# Patient Record
Sex: FEMALE | Race: BLACK OR AFRICAN AMERICAN | NOT HISPANIC OR LATINO
[De-identification: names, ages, dates, MRNs, and addresses within clinical notes are randomized per-mention and may not be internally consistent; named-entity substitution may affect disease eponyms.]

---

## 2020-02-25 PROBLEM — Z00.00 ENCOUNTER FOR PREVENTIVE HEALTH EXAMINATION: Status: ACTIVE | Noted: 2020-02-25

## 2020-06-04 ENCOUNTER — APPOINTMENT (OUTPATIENT)
Dept: GASTROENTEROLOGY | Facility: CLINIC | Age: 45
End: 2020-06-04

## 2023-11-09 ENCOUNTER — APPOINTMENT (OUTPATIENT)
Dept: SURGERY | Facility: CLINIC | Age: 48
End: 2023-11-09
Payer: COMMERCIAL

## 2023-11-09 VITALS
DIASTOLIC BLOOD PRESSURE: 80 MMHG | HEART RATE: 87 BPM | HEIGHT: 61 IN | BODY MASS INDEX: 37.76 KG/M2 | OXYGEN SATURATION: 98 % | WEIGHT: 200 LBS | SYSTOLIC BLOOD PRESSURE: 111 MMHG | RESPIRATION RATE: 17 BRPM | TEMPERATURE: 96.7 F

## 2023-11-09 DIAGNOSIS — Z87.09 PERSONAL HISTORY OF OTHER DISEASES OF THE RESPIRATORY SYSTEM: ICD-10-CM

## 2023-11-09 DIAGNOSIS — Z78.9 OTHER SPECIFIED HEALTH STATUS: ICD-10-CM

## 2023-11-09 DIAGNOSIS — Z80.3 FAMILY HISTORY OF MALIGNANT NEOPLASM OF BREAST: ICD-10-CM

## 2023-11-09 DIAGNOSIS — K43.9 VENTRAL HERNIA W/OUT OBSTRUCTION OR GANGRENE: ICD-10-CM

## 2023-11-09 PROCEDURE — 99203 OFFICE O/P NEW LOW 30 MIN: CPT

## 2023-11-09 RX ORDER — ESOMEPRAZOLE MAGNESIUM 5 MG/1
GRANULE, DELAYED RELEASE ORAL
Refills: 0 | Status: ACTIVE | COMMUNITY

## 2023-11-24 ENCOUNTER — TRANSCRIPTION ENCOUNTER (OUTPATIENT)
Age: 48
End: 2023-11-24

## 2023-11-24 ENCOUNTER — OUTPATIENT (OUTPATIENT)
Dept: OUTPATIENT SERVICES | Facility: HOSPITAL | Age: 48
LOS: 1 days | End: 2023-11-24
Payer: COMMERCIAL

## 2023-11-24 VITALS
WEIGHT: 214.95 LBS | SYSTOLIC BLOOD PRESSURE: 110 MMHG | HEIGHT: 62 IN | TEMPERATURE: 98 F | HEART RATE: 85 BPM | OXYGEN SATURATION: 98 % | DIASTOLIC BLOOD PRESSURE: 76 MMHG | RESPIRATION RATE: 15 BRPM

## 2023-11-24 DIAGNOSIS — Z90.3 ACQUIRED ABSENCE OF STOMACH [PART OF]: Chronic | ICD-10-CM

## 2023-11-24 DIAGNOSIS — M79.81 NONTRAUMATIC HEMATOMA OF SOFT TISSUE: ICD-10-CM

## 2023-11-24 DIAGNOSIS — Z98.890 OTHER SPECIFIED POSTPROCEDURAL STATES: Chronic | ICD-10-CM

## 2023-11-24 DIAGNOSIS — Q83.8 OTHER CONGENITAL MALFORMATIONS OF BREAST: ICD-10-CM

## 2023-11-24 DIAGNOSIS — L91.0 HYPERTROPHIC SCAR: ICD-10-CM

## 2023-11-24 DIAGNOSIS — Z01.818 ENCOUNTER FOR OTHER PREPROCEDURAL EXAMINATION: ICD-10-CM

## 2023-11-24 DIAGNOSIS — N62 HYPERTROPHY OF BREAST: ICD-10-CM

## 2023-11-24 DIAGNOSIS — Z98.891 HISTORY OF UTERINE SCAR FROM PREVIOUS SURGERY: Chronic | ICD-10-CM

## 2023-11-24 DIAGNOSIS — Z90.49 ACQUIRED ABSENCE OF OTHER SPECIFIED PARTS OF DIGESTIVE TRACT: Chronic | ICD-10-CM

## 2023-11-24 LAB
MRSA PCR RESULT.: SIGNIFICANT CHANGE UP
MRSA PCR RESULT.: SIGNIFICANT CHANGE UP
S AUREUS DNA NOSE QL NAA+PROBE: DETECTED
S AUREUS DNA NOSE QL NAA+PROBE: DETECTED

## 2023-11-24 PROCEDURE — 87641 MR-STAPH DNA AMP PROBE: CPT

## 2023-11-24 PROCEDURE — 87640 STAPH A DNA AMP PROBE: CPT

## 2023-11-24 PROCEDURE — G0463: CPT

## 2023-11-24 RX ORDER — SODIUM CHLORIDE 9 MG/ML
3 INJECTION INTRAMUSCULAR; INTRAVENOUS; SUBCUTANEOUS EVERY 8 HOURS
Refills: 0 | Status: DISCONTINUED | OUTPATIENT
Start: 2023-12-14 | End: 2023-12-28

## 2023-11-24 RX ORDER — SODIUM CHLORIDE 9 MG/ML
1000 INJECTION, SOLUTION INTRAVENOUS
Refills: 0 | Status: DISCONTINUED | OUTPATIENT
Start: 2023-12-14 | End: 2023-12-28

## 2023-11-24 RX ORDER — LIDOCAINE HCL 20 MG/ML
0.2 VIAL (ML) INJECTION ONCE
Refills: 0 | Status: DISCONTINUED | OUTPATIENT
Start: 2023-12-14 | End: 2023-12-28

## 2023-11-24 NOTE — H&P PST ADULT - PROBLEM SELECTOR PLAN 3
Scheduled for hematoma evacuation of abdomen with Dr. Araya  Chlorhexidine to affected area preop  MRSA done, pending, patient states umbilical hernia to be done

## 2023-11-24 NOTE — H&P PST ADULT - RESPIRATORY
clear to auscultation bilaterally/no wheezes/no rhonchi/airway patent/breath sounds equal/good air movement/respirations non-labored

## 2023-11-24 NOTE — H&P PST ADULT - REASON FOR ADMISSION
Breast reduction and hematoma removal and ? umbilica hernia repair Breast reduction, scar revision and hematoma removal and ? umbilica hernia repair

## 2023-11-24 NOTE — H&P PST ADULT - NSANTHOSAYNRD_GEN_A_CORE
No. GARRICK screening performed.  STOP BANG Legend: 0-2 = LOW Risk; 3-4 = INTERMEDIATE Risk; 5-8 = HIGH Risk

## 2023-11-24 NOTE — H&P PST ADULT - NSICDXPASTMEDICALHX_GEN_ALL_CORE_FT
PAST MEDICAL HISTORY:  Anemia     Severe obesity (BMI 35.0-39.9) with comorbidity     Sprained ankle      PAST MEDICAL HISTORY:  Anemia     Asthma     Severe obesity (BMI 35.0-39.9) with comorbidity     Sprained ankle

## 2023-11-24 NOTE — H&P PST ADULT - PROBLEM SELECTOR PLAN 1
Scheduled for Bilateral breast reduction, with Dr. Toro  Preop instructions given, patient instructed to call surgeon's office re: ambi vs main  CBC, BMP in HIE  Chlorhexidine to affected area preop

## 2023-11-24 NOTE — H&P PST ADULT - NSICDXPASTSURGICALHX_GEN_ALL_CORE_FT
PAST SURGICAL HISTORY:  H/O abdominoplasty      PAST SURGICAL HISTORY:  H/O abdominoplasty     H/O  section     H/O gastric sleeve     History of laparoscopic cholecystectomy

## 2023-11-24 NOTE — H&P PST ADULT - ASSESSMENT
Dental: denies loose teeth, no dentures, two implants upper right and lower right    DASI: Walking 2 days/week for 15 minutes, housework  SCORE; 6

## 2023-11-24 NOTE — H&P PST ADULT - HISTORY OF PRESENT ILLNESS
Ms. Rosales is a 48 year old woman with PMH Anemia, GERD, Asthma and obesity s/p abdominoplasty presents for bilateral breast reduction, abdominal scar revision, and hematoma evacuation of abdomen on 12/14/2023.  Patient states that she is also having umbilical hernia  repair.

## 2023-11-28 DIAGNOSIS — Z22.321 CARRIER OR SUSPECTED CARRIER OF METHICILLIN SUSCEPTIBLE STAPHYLOCOCCUS AUREUS: ICD-10-CM

## 2023-11-28 RX ORDER — MUPIROCIN 20 MG/G
2 OINTMENT TOPICAL
Qty: 1 | Refills: 0 | Status: ACTIVE | COMMUNITY
Start: 2023-11-28 | End: 1900-01-01

## 2023-12-07 DIAGNOSIS — S30.1XXA CONTUSION OF ABDOMINAL WALL, INITIAL ENCOUNTER: ICD-10-CM

## 2023-12-08 ENCOUNTER — TRANSCRIPTION ENCOUNTER (OUTPATIENT)
Age: 48
End: 2023-12-08

## 2023-12-08 PROBLEM — J45.909 UNSPECIFIED ASTHMA, UNCOMPLICATED: Chronic | Status: ACTIVE | Noted: 2023-11-24

## 2023-12-08 PROBLEM — D64.9 ANEMIA, UNSPECIFIED: Chronic | Status: ACTIVE | Noted: 2023-11-24

## 2023-12-08 PROBLEM — S93.409A SPRAIN OF UNSPECIFIED LIGAMENT OF UNSPECIFIED ANKLE, INITIAL ENCOUNTER: Chronic | Status: ACTIVE | Noted: 2023-11-24

## 2023-12-08 PROBLEM — E66.01 MORBID (SEVERE) OBESITY DUE TO EXCESS CALORIES: Chronic | Status: ACTIVE | Noted: 2023-11-24

## 2023-12-13 ENCOUNTER — TRANSCRIPTION ENCOUNTER (OUTPATIENT)
Age: 48
End: 2023-12-13

## 2023-12-14 ENCOUNTER — OUTPATIENT (OUTPATIENT)
Dept: OUTPATIENT SERVICES | Facility: HOSPITAL | Age: 48
LOS: 1 days | End: 2023-12-14
Payer: COMMERCIAL

## 2023-12-14 ENCOUNTER — APPOINTMENT (OUTPATIENT)
Dept: SURGERY | Facility: HOSPITAL | Age: 48
End: 2023-12-14
Payer: COMMERCIAL

## 2023-12-14 ENCOUNTER — TRANSCRIPTION ENCOUNTER (OUTPATIENT)
Age: 48
End: 2023-12-14

## 2023-12-14 VITALS
RESPIRATION RATE: 16 BRPM | SYSTOLIC BLOOD PRESSURE: 107 MMHG | OXYGEN SATURATION: 99 % | WEIGHT: 214.95 LBS | DIASTOLIC BLOOD PRESSURE: 73 MMHG | HEART RATE: 78 BPM | HEIGHT: 62 IN | TEMPERATURE: 97 F

## 2023-12-14 DIAGNOSIS — Z90.3 ACQUIRED ABSENCE OF STOMACH [PART OF]: Chronic | ICD-10-CM

## 2023-12-14 DIAGNOSIS — N62 HYPERTROPHY OF BREAST: ICD-10-CM

## 2023-12-14 DIAGNOSIS — Q83.8 OTHER CONGENITAL MALFORMATIONS OF BREAST: ICD-10-CM

## 2023-12-14 DIAGNOSIS — Z98.891 HISTORY OF UTERINE SCAR FROM PREVIOUS SURGERY: Chronic | ICD-10-CM

## 2023-12-14 DIAGNOSIS — Z90.49 ACQUIRED ABSENCE OF OTHER SPECIFIED PARTS OF DIGESTIVE TRACT: Chronic | ICD-10-CM

## 2023-12-14 DIAGNOSIS — L91.0 HYPERTROPHIC SCAR: ICD-10-CM

## 2023-12-14 DIAGNOSIS — Z98.890 OTHER SPECIFIED POSTPROCEDURAL STATES: Chronic | ICD-10-CM

## 2023-12-14 DIAGNOSIS — M79.81 NONTRAUMATIC HEMATOMA OF SOFT TISSUE: ICD-10-CM

## 2023-12-14 LAB
GRAM STN FLD: SIGNIFICANT CHANGE UP
GRAM STN FLD: SIGNIFICANT CHANGE UP
SPECIMEN SOURCE: SIGNIFICANT CHANGE UP
SPECIMEN SOURCE: SIGNIFICANT CHANGE UP

## 2023-12-14 PROCEDURE — 88305 TISSUE EXAM BY PATHOLOGIST: CPT | Mod: 26

## 2023-12-14 PROCEDURE — 88302 TISSUE EXAM BY PATHOLOGIST: CPT | Mod: 26

## 2023-12-14 PROCEDURE — 88307 TISSUE EXAM BY PATHOLOGIST: CPT | Mod: 26

## 2023-12-14 PROCEDURE — 10140 I&D HMTMA SEROMA/FLUID COLLJ: CPT

## 2023-12-14 PROCEDURE — 49591 RPR AA HRN 1ST < 3 CM RDC: CPT

## 2023-12-14 PROCEDURE — 88304 TISSUE EXAM BY PATHOLOGIST: CPT | Mod: 26

## 2023-12-14 RX ORDER — ACETAMINOPHEN 500 MG
1000 TABLET ORAL ONCE
Refills: 0 | Status: COMPLETED | OUTPATIENT
Start: 2023-12-14 | End: 2023-12-14

## 2023-12-14 RX ORDER — PANTOPRAZOLE SODIUM 20 MG/1
40 TABLET, DELAYED RELEASE ORAL
Refills: 0 | Status: DISCONTINUED | OUTPATIENT
Start: 2023-12-14 | End: 2023-12-28

## 2023-12-14 RX ORDER — ONDANSETRON 8 MG/1
4 TABLET, FILM COATED ORAL ONCE
Refills: 0 | Status: DISCONTINUED | OUTPATIENT
Start: 2023-12-14 | End: 2023-12-28

## 2023-12-14 RX ORDER — SODIUM CHLORIDE 9 MG/ML
500 INJECTION, SOLUTION INTRAVENOUS ONCE
Refills: 0 | Status: DISCONTINUED | OUTPATIENT
Start: 2023-12-14 | End: 2023-12-28

## 2023-12-14 RX ORDER — CEFAZOLIN SODIUM 1 G
1000 VIAL (EA) INJECTION EVERY 8 HOURS
Refills: 0 | Status: DISCONTINUED | OUTPATIENT
Start: 2023-12-14 | End: 2023-12-28

## 2023-12-14 RX ORDER — ENOXAPARIN SODIUM 100 MG/ML
40 INJECTION SUBCUTANEOUS ONCE
Refills: 0 | Status: COMPLETED | OUTPATIENT
Start: 2023-12-15 | End: 2023-12-15

## 2023-12-14 RX ORDER — CHLORHEXIDINE GLUCONATE 213 G/1000ML
1 SOLUTION TOPICAL ONCE
Refills: 0 | Status: COMPLETED | OUTPATIENT
Start: 2023-12-14 | End: 2023-12-14

## 2023-12-14 RX ORDER — FAMOTIDINE 10 MG/ML
20 INJECTION INTRAVENOUS ONCE
Refills: 0 | Status: COMPLETED | OUTPATIENT
Start: 2023-12-14 | End: 2023-12-14

## 2023-12-14 RX ORDER — TRAMADOL HYDROCHLORIDE 50 MG/1
50 TABLET ORAL EVERY 4 HOURS
Refills: 0 | Status: DISCONTINUED | OUTPATIENT
Start: 2023-12-14 | End: 2023-12-15

## 2023-12-14 RX ORDER — ESOMEPRAZOLE MAGNESIUM 40 MG/1
1 CAPSULE, DELAYED RELEASE ORAL
Refills: 0 | DISCHARGE

## 2023-12-14 RX ORDER — FENTANYL CITRATE 50 UG/ML
25 INJECTION INTRAVENOUS
Refills: 0 | Status: DISCONTINUED | OUTPATIENT
Start: 2023-12-14 | End: 2023-12-14

## 2023-12-14 RX ORDER — CALCIUM CARBONATE 500(1250)
1 TABLET ORAL EVERY 4 HOURS
Refills: 0 | Status: DISCONTINUED | OUTPATIENT
Start: 2023-12-14 | End: 2023-12-28

## 2023-12-14 RX ORDER — CEFAZOLIN SODIUM 1 G
2000 VIAL (EA) INJECTION ONCE
Refills: 0 | Status: COMPLETED | OUTPATIENT
Start: 2023-12-14 | End: 2023-12-14

## 2023-12-14 RX ORDER — ACETAMINOPHEN 500 MG
2 TABLET ORAL
Refills: 0 | DISCHARGE

## 2023-12-14 RX ADMIN — SODIUM CHLORIDE 100 MILLILITER(S): 9 INJECTION, SOLUTION INTRAVENOUS at 06:03

## 2023-12-14 RX ADMIN — SODIUM CHLORIDE 3 MILLILITER(S): 9 INJECTION INTRAMUSCULAR; INTRAVENOUS; SUBCUTANEOUS at 23:53

## 2023-12-14 RX ADMIN — Medication 1000 MILLIGRAM(S): at 21:00

## 2023-12-14 RX ADMIN — TRAMADOL HYDROCHLORIDE 50 MILLIGRAM(S): 50 TABLET ORAL at 23:50

## 2023-12-14 RX ADMIN — TRAMADOL HYDROCHLORIDE 50 MILLIGRAM(S): 50 TABLET ORAL at 17:54

## 2023-12-14 RX ADMIN — Medication 100 MILLIGRAM(S): at 21:46

## 2023-12-14 RX ADMIN — FENTANYL CITRATE 25 MICROGRAM(S): 50 INJECTION INTRAVENOUS at 17:50

## 2023-12-14 RX ADMIN — CHLORHEXIDINE GLUCONATE 1 APPLICATION(S): 213 SOLUTION TOPICAL at 06:03

## 2023-12-14 RX ADMIN — FENTANYL CITRATE 25 MICROGRAM(S): 50 INJECTION INTRAVENOUS at 15:05

## 2023-12-14 RX ADMIN — Medication 1 TABLET(S): at 23:48

## 2023-12-14 RX ADMIN — Medication 400 MILLIGRAM(S): at 19:56

## 2023-12-14 RX ADMIN — FAMOTIDINE 20 MILLIGRAM(S): 10 INJECTION INTRAVENOUS at 23:25

## 2023-12-14 NOTE — ASU DISCHARGE PLAN (ADULT/PEDIATRIC) - CARE PROVIDER_API CALL
Declan Toro  Plastic Surgery  [address] [facility]  [place] [address]  Phone: [phone]  Fax: [phone]  Established Patient  Follow Up Time: 1-3 days   Declan Toro  Plastic Surgery  [address] [facility], [address]  [place] [address]  Phone: [phone]  Fax: [phone]  Established Patient  Follow Up Time: 1-3 days

## 2023-12-14 NOTE — BRIEF OPERATIVE NOTE - NSICDXBRIEFPROCEDURE_GEN_ALL_CORE_FT
PROCEDURES:  Reduction mammoplasty with abdominoplasty, with repair of ventral hernia if indicated 14-Dec-2023 12:47:50  Declan Toro  Liposuction, torso 14-Dec-2023 12:48:09  Declan Toro  Evacuation, hematoma, abdomen 14-Dec-2023 12:48:22  Declan Toro

## 2023-12-14 NOTE — BRIEF OPERATIVE NOTE - OPERATION/FINDINGS
Pt states rt sided abdominal pain unable to move bowels, did have a BM today but very little nothing yesterday.  Was seen in ed for the same last week  
Hematoma below rectus sheeth found and evacuated, ventral hernia repaired intraoperatively by general surgery.

## 2023-12-14 NOTE — ASU DISCHARGE PLAN (ADULT/PEDIATRIC) - ASU DC SPECIAL INSTRUCTIONSFT
No heavy lifting/pushing/pulling/strenuous activity.   Sleep with head of bed elevated and hips flexed (i.e. pillows below knees) to help keep tension off of abdomen. DO NOT LAY FLAT.   Empty and record drain output twice daily.   Take all medications sent from our office as prescribed.   Sponge bath only for the first 24 hours after surgery.   Keep all dressing and garments in place until follow up in office.

## 2023-12-14 NOTE — ASU PATIENT PROFILE, ADULT - NSICDXPASTMEDICALHX_GEN_ALL_CORE_FT
PAST MEDICAL HISTORY:  Anemia     Asthma     Severe obesity (BMI 35.0-39.9) with comorbidity     Sprained ankle

## 2023-12-14 NOTE — BRIEF OPERATIVE NOTE - NSICDXBRIEFPREOP_GEN_ALL_CORE_FT
PRE-OP DIAGNOSIS:  Diastasis recti 14-Dec-2023 12:44:14  Declan Toro  Hypertrophic scar 14-Dec-2023 12:44:19  Declan Toro  Other ventral hernia 14-Dec-2023 12:44:17  Declan Toro  Macromastia 14-Dec-2023 12:44:11  Declan Toro

## 2023-12-14 NOTE — ASU DISCHARGE PLAN (ADULT/PEDIATRIC) - PROVIDER TOKENS
PROVIDER:[TOKEN:[[id number]],FOLLOWUP:[1-3 days],ESTABLISHEDPATIENT:[T]] PROVIDER:[TOKEN:[[id number]],FOLLOWUP:[1-3 days],ESTABLISHEDPATIENT:[T]]

## 2023-12-14 NOTE — ASU PATIENT PROFILE, ADULT - NSICDXPASTSURGICALHX_GEN_ALL_CORE_FT
Maritza Vilchis  Pt is a 31year old  at 34w0d   Doing well. Denies LOF/VB/uctx. +FM. Mode of delivery:  anticipated    BPM   FH 34 cm   GBS next week with position. Breech on 32 week US.      RTC 2 weeks    Dr. Naveen Russell MD    [facility] Pt is aware of normal pelvic u/s results.     PAST SURGICAL HISTORY:  H/O abdominoplasty     H/O  section     H/O gastric sleeve     History of laparoscopic cholecystectomy

## 2023-12-14 NOTE — BRIEF OPERATIVE NOTE - NSICDXBRIEFPOSTOP_GEN_ALL_CORE_FT
POST-OP DIAGNOSIS:  Hematoma 14-Dec-2023 12:44:42  Declan Toro  Macromastia 14-Dec-2023 12:42:06  Declan Toro  Diastasis recti 14-Dec-2023 12:42:43  Declan Toro  Other ventral hernia 14-Dec-2023 12:43:29  Declan Toro  Hypertrophic scar 14-Dec-2023 12:43:54  Declan Toro   POST-OP DIAGNOSIS:  Hematoma 14-Dec-2023 12:44:42  Declan Toro  Macromastia 14-Dec-2023 12:42:06  Declan Toro  Diastasis recti 14-Dec-2023 12:42:43  Declan Toro  Other ventral hernia 14-Dec-2023 12:43:29  Declan Toro  Hypertrophic scar 14-Dec-2023 12:43:54  Dcelan Toro

## 2023-12-14 NOTE — ASU DISCHARGE PLAN (ADULT/PEDIATRIC) - NURSING INSTRUCTIONS
You were given Tylenol during your visit, the next dose of Tylenol will be on or after _____12:00 PM______ ,today/tonight and every 6 hours afterwards for pain management, do not take any Tylenol containing products until this time. Do not exceed more than 4000mg of Tylenol in one 24 hour setting. You were given Tylenol during your visit, the next dose of Tylenol will be on or after _____12:00 PM______ ,today/tonight and every 6 hours afterwards for pain management, do not take any Tylenol containing products until this time. Do not exceed more than 4000mg of Tylenol in one 24 hour setting. Next dose of antibiotics can be taken on or after ___2:00 PM___, follow dosing frequency based on prescription. Tramadol may be taken every 6 hours, next dose may be taken on or after ___10:00 AM____.

## 2023-12-14 NOTE — CHART NOTE - NSCHARTNOTEFT_GEN_A_CORE
General Surgery Post op Check    Pt seen and examined without complaints. Reports mild abdominal.. Denies SOB/CP/N/V.     Vital Signs Last 24 Hrs  T(C): 36.6 (14 Dec 2023 12:25), Max: 36.6 (14 Dec 2023 12:25)  T(F): 97.9 (14 Dec 2023 12:25), Max: 97.9 (14 Dec 2023 12:25)  HR: 100 (14 Dec 2023 18:30) (75 - 100)  BP: 112/62 (14 Dec 2023 18:30) (68/41 - 119/56)  BP(mean): 82 (14 Dec 2023 18:30) (51 - 82)  RR: 16 (14 Dec 2023 18:30) (12 - 16)  SpO2: 98% (14 Dec 2023 18:30) (95% - 100%)    Parameters below as of 14 Dec 2023 18:30  Patient On (Oxygen Delivery Method): room air        I&O's Summary    14 Dec 2023 07:01  -  14 Dec 2023 20:44  --------------------------------------------------------  IN: 2940 mL / OUT: 610 mL / NET: 2330 mL        Physical Exam  Gen: NAD, A&Ox3  Pulm: No respiratory distress, no subcostal retractions  CV: RRR, no JVD  Abd: Soft, NT, ND  Breast:  dressing in supportive bra with mild   Drains: YINA x 4  Ouput as follow since 1800  R Abdomnm: 70 cc  L Abdomen: 15 cc  R Breast: 5 cc  L Breast: 10cc  Extremities:  FROM, warm and well perfused, equal bilateral muscle strength      A/P: 48y Female s/p Reduction mammoplasty with abdominoplasty, with repair of ventral hernia, Liposuction to the torso, Hematoma Evacuation by general Sx. Mildly Tachycardic, remainder of vitals stable.  No acute distress.    -DVT prophylaxis with Lovenox, Encouraged OOB  -Hypotension: Encourage PO fluid intake, IV Fluids 120cc/hr x 12 hrs  -Strict I&O's  -Monitor YINA drain output  -Analgesia and antiemetics as needed  -Regular Diet  -Monitor overnight  -Dispo:  Discharge home in AM    Paawn Osuna PA-C  Ambulatory Surgery  #[id number] General Surgery Post op Check    Pt seen and examined without complaints. Reports mild abdominal.. Denies SOB/CP/N/V.     Vital Signs Last 24 Hrs  T(C): 36.6 (14 Dec 2023 12:25), Max: 36.6 (14 Dec 2023 12:25)  T(F): 97.9 (14 Dec 2023 12:25), Max: 97.9 (14 Dec 2023 12:25)  HR: 100 (14 Dec 2023 18:30) (75 - 100)  BP: 112/62 (14 Dec 2023 18:30) (68/41 - 119/56)  BP(mean): 82 (14 Dec 2023 18:30) (51 - 82)  RR: 16 (14 Dec 2023 18:30) (12 - 16)  SpO2: 98% (14 Dec 2023 18:30) (95% - 100%)    Parameters below as of 14 Dec 2023 18:30  Patient On (Oxygen Delivery Method): room air        I&O's Summary    14 Dec 2023 07:01  -  14 Dec 2023 20:44  --------------------------------------------------------  IN: 2940 mL / OUT: 610 mL / NET: 2330 mL        Physical Exam  Gen: NAD, A&Ox3  Pulm: No respiratory distress, no subcostal retractions  CV: RRR, no JVD  Abd: Soft, NT, ND  Breast:  dressing in supportive bra with mild   Drains: YINA x 4  Ouput as follow since 1800  R Abdomnm: 70 cc  L Abdomen: 15 cc  R Breast: 5 cc  L Breast: 10cc  Extremities:  FROM, warm and well perfused, equal bilateral muscle strength      A/P: 48y Female s/p Reduction mammoplasty with abdominoplasty, with repair of ventral hernia, Liposuction to the torso, Hematoma Evacuation by general Sx. Mildly Tachycardic, remainder of vitals stable.  No acute distress.    -DVT prophylaxis with Lovenox, Encouraged OOB  -Hypotension: Encourage PO fluid intake, IV Fluids 120cc/hr x 12 hrs  -Strict I&O's  -Monitor YINA drain output  -Analgesia and antiemetics as needed  -Regular Diet  -Monitor overnight  -Dispo:  Discharge home in AM    Pawan Osuna PA-C  Ambulatory Surgery  #[id number]

## 2023-12-14 NOTE — ASU PATIENT PROFILE, ADULT - FALL HARM RISK - UNIVERSAL INTERVENTIONS
Bed in lowest position, wheels locked, appropriate side rails in place/Call bell, personal items and telephone in reach/Instruct patient to call for assistance before getting out of bed or chair/Non-slip footwear when patient is out of bed/[place] to call system/Physically safe environment - no spills, clutter or unnecessary equipment/Purposeful Proactive Rounding/Room/bathroom lighting operational, light cord in reach Bed in lowest position, wheels locked, appropriate side rails in place/Call bell, personal items and telephone in reach/Instruct patient to call for assistance before getting out of bed or chair/Non-slip footwear when patient is out of bed/[place] to call system/Physically safe environment - no spills, clutter or unnecessary equipment/Purposeful Proactive Rounding/Room/bathroom lighting operational, light cord in reach

## 2023-12-14 NOTE — ASU DISCHARGE PLAN (ADULT/PEDIATRIC) - NS MD DC FALL RISK RISK
For information on Fall & Injury Prevention, visit: [email or web address] OR  [email or web address] OR  https://www.cdc.gov/steadi/patient.html For information on Fall & Injury Prevention, visit: [email or web address] OR  [email or web address] OR  https://www.cdc.gov/steadi/patient.html

## 2023-12-15 VITALS
HEART RATE: 110 BPM | DIASTOLIC BLOOD PRESSURE: 68 MMHG | OXYGEN SATURATION: 98 % | RESPIRATION RATE: 16 BRPM | SYSTOLIC BLOOD PRESSURE: 122 MMHG

## 2023-12-15 PROCEDURE — 88302 TISSUE EXAM BY PATHOLOGIST: CPT

## 2023-12-15 PROCEDURE — 87075 CULTR BACTERIA EXCEPT BLOOD: CPT

## 2023-12-15 PROCEDURE — 88304 TISSUE EXAM BY PATHOLOGIST: CPT

## 2023-12-15 PROCEDURE — 88307 TISSUE EXAM BY PATHOLOGIST: CPT

## 2023-12-15 PROCEDURE — C9399: CPT

## 2023-12-15 PROCEDURE — 49591 RPR AA HRN 1ST < 3 CM RDC: CPT | Mod: XP

## 2023-12-15 PROCEDURE — 88305 TISSUE EXAM BY PATHOLOGIST: CPT

## 2023-12-15 PROCEDURE — 15830 EXC EXCESSIVE SKIN ABDOMEN: CPT

## 2023-12-15 PROCEDURE — 10160 PNXR ASPIR ABSC HMTMA BULLA: CPT

## 2023-12-15 PROCEDURE — 15847 EXC SKIN ABD ADD-ON: CPT

## 2023-12-15 PROCEDURE — 87205 SMEAR GRAM STAIN: CPT

## 2023-12-15 PROCEDURE — 15877 SUCTION LIPECTOMY TRUNK: CPT | Mod: XS

## 2023-12-15 PROCEDURE — 19318 BREAST REDUCTION: CPT | Mod: 50

## 2023-12-15 PROCEDURE — 87070 CULTURE OTHR SPECIMN AEROBIC: CPT

## 2023-12-15 RX ORDER — OXYCODONE HYDROCHLORIDE 5 MG/1
2.5 TABLET ORAL ONCE
Refills: 0 | Status: DISCONTINUED | OUTPATIENT
Start: 2023-12-15 | End: 2023-12-15

## 2023-12-15 RX ORDER — ACETAMINOPHEN 500 MG
1000 TABLET ORAL ONCE
Refills: 0 | Status: COMPLETED | OUTPATIENT
Start: 2023-12-15 | End: 2023-12-15

## 2023-12-15 RX ORDER — DIPHENHYDRAMINE HCL 50 MG
12.5 CAPSULE ORAL ONCE
Refills: 0 | Status: DISCONTINUED | OUTPATIENT
Start: 2023-12-15 | End: 2023-12-28

## 2023-12-15 RX ADMIN — PANTOPRAZOLE SODIUM 40 MILLIGRAM(S): 20 TABLET, DELAYED RELEASE ORAL at 05:54

## 2023-12-15 RX ADMIN — TRAMADOL HYDROCHLORIDE 50 MILLIGRAM(S): 50 TABLET ORAL at 00:30

## 2023-12-15 RX ADMIN — Medication 100 MILLIGRAM(S): at 05:53

## 2023-12-15 RX ADMIN — TRAMADOL HYDROCHLORIDE 50 MILLIGRAM(S): 50 TABLET ORAL at 05:00

## 2023-12-15 RX ADMIN — TRAMADOL HYDROCHLORIDE 50 MILLIGRAM(S): 50 TABLET ORAL at 04:01

## 2023-12-15 RX ADMIN — SODIUM CHLORIDE 3 MILLILITER(S): 9 INJECTION INTRAMUSCULAR; INTRAVENOUS; SUBCUTANEOUS at 05:48

## 2023-12-15 RX ADMIN — Medication 400 MILLIGRAM(S): at 02:09

## 2023-12-15 RX ADMIN — OXYCODONE HYDROCHLORIDE 2.5 MILLIGRAM(S): 5 TABLET ORAL at 02:48

## 2023-12-15 RX ADMIN — ENOXAPARIN SODIUM 40 MILLIGRAM(S): 100 INJECTION SUBCUTANEOUS at 07:34

## 2023-12-15 RX ADMIN — Medication 1000 MILLIGRAM(S): at 02:45

## 2023-12-15 RX ADMIN — OXYCODONE HYDROCHLORIDE 2.5 MILLIGRAM(S): 5 TABLET ORAL at 04:00

## 2023-12-19 LAB
CULTURE RESULTS: SIGNIFICANT CHANGE UP
CULTURE RESULTS: SIGNIFICANT CHANGE UP
SPECIMEN SOURCE: SIGNIFICANT CHANGE UP
SPECIMEN SOURCE: SIGNIFICANT CHANGE UP

## 2023-12-20 LAB
SURGICAL PATHOLOGY STUDY: SIGNIFICANT CHANGE UP
SURGICAL PATHOLOGY STUDY: SIGNIFICANT CHANGE UP

## 2024-05-30 NOTE — H&P PST ADULT - NSHP PST SURGERY DATE_DT_GEN_A_CORE
Lenore Sofia is a No obstetric history on file.,  61 y.o. female White (non-) whose LMP was on  who presents for her annual checkup. She is having no problems. Ran out of clobetasol    Menstrual status:      Pt is postmenopausal    The patient is not using HRT.    Contraception:    The current method of family planning is post menopausal status.    Sexual history:    She  reports being sexually active and has had partner(s) who are male.        Pap and Mammogram History:    Last Pap: 5/6/2022 normal/HPV neg  She does not have a history of JERRELL 2, 3 or cervical cancer.   Last Mammogram: had her mammogram today in our office.    Last colonoscopy: not recent per patient      Breast Cancer History    She has no family history of breast cancer.    Family History   Problem Relation Age of Onset    Stroke Paternal Grandfather     Diabetes Mother     Heart Attack Mother     Coronary Art Dis Mother     Stroke Maternal Grandmother     Stroke Father     Hypertension Father        Past Medical History:   Diagnosis Date    Hypercholesterolemia     Hypertension     Lichen sclerosus 2022    Muscle spasm     neck & trapezius    Tachycardia     sinus tachycardia     Past Surgical History:   Procedure Laterality Date    BREAST SURGERY      calcium deposits removed from R breast     Current Outpatient Medications   Medication Sig Dispense Refill    chlorthalidone (HYGROTON) 25 MG tablet Take 1 tablet by mouth daily 30 tablet 2    phentermine (ADIPEX-P) 37.5 MG tablet Take 1/2 pill in AM and 1/2 pill early afternoon 30 tablet 2    rosuvastatin (CRESTOR) 5 MG tablet Take 1 tablet by mouth nightly 90 tablet 1    metFORMIN (GLUCOPHAGE) 500 MG tablet Take 1 tablet by mouth 2 times daily (with meals) Start once a day and then increase two times a day 60 tablet 5    potassium chloride (KLOR-CON M) 20 MEQ extended release tablet Take 1 tablet by mouth daily 60 tablet 11    clobetasol (TEMOVATE) 0.05 % ointment Apply topically 2  14-Dec-2023
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
## 2025-02-18 NOTE — H&P PST ADULT - TOBACCO USE
.        [facility]  EMERGENCY DEPARTMENT ENCOUNTER        Pt Name: Daylin Hastings  MRN: [id number]  Birthdate 
Never smoker

## 2025-06-12 ENCOUNTER — OUTPATIENT (OUTPATIENT)
Dept: OUTPATIENT SERVICES | Facility: HOSPITAL | Age: 50
LOS: 1 days | End: 2025-06-12

## 2025-06-12 VITALS
SYSTOLIC BLOOD PRESSURE: 119 MMHG | RESPIRATION RATE: 16 BRPM | HEART RATE: 85 BPM | HEIGHT: 61 IN | OXYGEN SATURATION: 98 % | TEMPERATURE: 98 F | WEIGHT: 222.01 LBS | DIASTOLIC BLOOD PRESSURE: 84 MMHG

## 2025-06-12 DIAGNOSIS — L91.0 HYPERTROPHIC SCAR: ICD-10-CM

## 2025-06-12 DIAGNOSIS — Z90.3 ACQUIRED ABSENCE OF STOMACH [PART OF]: Chronic | ICD-10-CM

## 2025-06-12 DIAGNOSIS — Z98.890 OTHER SPECIFIED POSTPROCEDURAL STATES: Chronic | ICD-10-CM

## 2025-06-12 DIAGNOSIS — Z98.891 HISTORY OF UTERINE SCAR FROM PREVIOUS SURGERY: Chronic | ICD-10-CM

## 2025-06-12 NOTE — H&P PST ADULT - ENMT COMMENTS
no hemoptysis/no orthopnea/no shortness of breath/no exertional dyspnea/no wheezing FROM of neck; Denies loose teeth

## 2025-06-12 NOTE — H&P PST ADULT - NSICDXPASTSURGICALHX_GEN_ALL_CORE_FT
PAST SURGICAL HISTORY:  H/O abdominoplasty     History of 3  sections     S/P cholecystectomy     S/P gastric sleeve procedure     Status post breast reduction

## 2025-06-12 NOTE — H&P PST ADULT - NSICDXPASTMEDICALHX_GEN_ALL_CORE_FT
PAST MEDICAL HISTORY:  Asthma     GERD (gastroesophageal reflux disease)     Hypertrophic scar     Obese

## 2025-06-12 NOTE — H&P PST ADULT - RESPIRATORY RATE (BREATHS/MIN)
Angelina Gonzalez, a  at 36w4d with an JATINDER of 2019, by Ultrasound, was seen at [facility] for a nonstress test.    Nonstress Test  Reason for NST: Diabetes  Variability: Moderate  Decelerations: None  Accelerations: Yes  Baseline: 130  Uterine Irritability: No  Contractions: Not present  Nonstress Test Interpretation  Comments: reactive NST per my read - Dr. Quinn               16

## 2025-06-12 NOTE — H&P PST ADULT - PROBLEM SELECTOR PLAN 1
Patient tentatively scheduled for surgery on: 6/19/25  Provided with verbal and written presurgical instructions  Patient instructed to hold NSAIDs, multivitamins and herbal supplements one week prior to surgery

## 2025-06-12 NOTE — H&P PST ADULT - HISTORY OF PRESENT ILLNESS
50 yr old female presents with hypertrophic scar after breast reduction and abdominoplasty scheduled for bilateral breast scar revision abdominal scar revision

## 2025-06-12 NOTE — H&P PST ADULT - FUNCTIONAL STATUS
Self reported functional capacity DASI score:  9.89  Denies chest pain, dyspnea, palpitations, syncope, orthopnea or claudication/4-10 METS

## 2025-06-18 NOTE — ASU PATIENT PROFILE, ADULT - MUTUALITY COMMENT, PROFILE
Discussed with Pt her ability to have questions answered by dr Duron & anesthesia before going into the OR

## 2025-06-18 NOTE — ASU PATIENT PROFILE, ADULT - NS TRANSFER HEARING AID
Patient called and said she had to leave work early today due to the severity of the migraine she is experiencing  Patient said the Shamar Deleon is not working, and is requesting if another prescription could be sent to the Pharmacy that may be stronger or better help patient's severe migraine   I explained to patient that the doctor's prefer to see the patient first whether in office or virtually, but patient said she did not want to do that until a message was sent to Dr Emil Navarrete first advising what to do none

## 2025-06-18 NOTE — ASU PATIENT PROFILE, ADULT - FALL HARM RISK - UNIVERSAL INTERVENTIONS
Bed in lowest position, wheels locked, appropriate side rails in place/Call bell, personal items and telephone in reach/Instruct patient to call for assistance before getting out of bed or chair/Non-slip footwear when patient is out of bed/[place] to call system/Physically safe environment - no spills, clutter or unnecessary equipment/Purposeful Proactive Rounding/Room/bathroom lighting operational, light cord in reach

## 2025-06-18 NOTE — ASU PATIENT PROFILE, ADULT - NSCAFFEINEWITH_GEN_ALL_CORE_SD
Pt discharged home. Tele box removed, IV dc'd, discharge instructions and prescriptions provided; pt verbalized understanding. Pt walked out to meet ride at [facility].   denies

## 2025-06-19 ENCOUNTER — TRANSCRIPTION ENCOUNTER (OUTPATIENT)
Age: 50
End: 2025-06-19

## 2025-06-19 ENCOUNTER — OUTPATIENT (OUTPATIENT)
Dept: OUTPATIENT SERVICES | Facility: HOSPITAL | Age: 50
LOS: 1 days | End: 2025-06-19
Payer: COMMERCIAL

## 2025-06-19 VITALS
WEIGHT: 222.01 LBS | OXYGEN SATURATION: 99 % | SYSTOLIC BLOOD PRESSURE: 130 MMHG | RESPIRATION RATE: 14 BRPM | HEART RATE: 89 BPM | TEMPERATURE: 98 F | DIASTOLIC BLOOD PRESSURE: 75 MMHG | HEIGHT: 61 IN

## 2025-06-19 VITALS
RESPIRATION RATE: 18 BRPM | TEMPERATURE: 99 F | DIASTOLIC BLOOD PRESSURE: 98 MMHG | HEART RATE: 90 BPM | OXYGEN SATURATION: 100 % | SYSTOLIC BLOOD PRESSURE: 118 MMHG

## 2025-06-19 DIAGNOSIS — Z98.890 OTHER SPECIFIED POSTPROCEDURAL STATES: Chronic | ICD-10-CM

## 2025-06-19 DIAGNOSIS — Z98.891 HISTORY OF UTERINE SCAR FROM PREVIOUS SURGERY: Chronic | ICD-10-CM

## 2025-06-19 DIAGNOSIS — Z90.3 ACQUIRED ABSENCE OF STOMACH [PART OF]: Chronic | ICD-10-CM

## 2025-06-19 DIAGNOSIS — L91.0 HYPERTROPHIC SCAR: ICD-10-CM

## 2025-06-19 LAB — HCG UR QL: NEGATIVE — SIGNIFICANT CHANGE UP

## 2025-06-19 RX ORDER — OMEPRAZOLE 20 MG/1
1 CAPSULE, DELAYED RELEASE ORAL
Refills: 0 | DISCHARGE

## 2025-06-19 RX ORDER — ONDANSETRON HCL/PF 4 MG/2 ML
4 VIAL (ML) INJECTION ONCE
Refills: 0 | Status: COMPLETED | OUTPATIENT
Start: 2025-06-19 | End: 2025-06-19

## 2025-06-19 RX ORDER — SODIUM CHLORIDE 9 G/1000ML
1000 INJECTION, SOLUTION INTRAVENOUS
Refills: 0 | Status: ACTIVE | OUTPATIENT
Start: 2025-06-19 | End: 2026-05-18

## 2025-06-19 RX ORDER — HYDROMORPHONE/SOD CHLOR,ISO/PF 2 MG/10 ML
0.5 SYRINGE (ML) INJECTION
Refills: 0 | Status: DISCONTINUED | OUTPATIENT
Start: 2025-06-19 | End: 2025-06-19

## 2025-06-19 RX ORDER — METOCLOPRAMIDE HCL 10 MG
10 TABLET ORAL ONCE
Refills: 0 | Status: COMPLETED | OUTPATIENT
Start: 2025-06-19 | End: 2025-06-19

## 2025-06-19 RX ORDER — BUPIVACAINE 13.3 MG/ML
20 INJECTION, SUSPENSION, LIPOSOMAL INFILTRATION ONCE
Refills: 0 | Status: ACTIVE | OUTPATIENT
Start: 2025-06-19

## 2025-06-19 RX ADMIN — Medication 4 MILLIGRAM(S): at 19:17

## 2025-06-19 RX ADMIN — Medication 10 MILLIGRAM(S): at 20:12

## 2025-06-19 RX ADMIN — Medication 20 MILLIGRAM(S): at 20:04

## 2025-06-19 RX ADMIN — Medication 0.5 MILLIGRAM(S): at 18:09

## 2025-06-19 RX ADMIN — Medication 0.5 MILLIGRAM(S): at 18:30

## 2025-06-19 RX ADMIN — Medication 0.5 MILLIGRAM(S): at 18:58

## 2025-06-19 RX ADMIN — SODIUM CHLORIDE 75 MILLILITER(S): 9 INJECTION, SOLUTION INTRAVENOUS at 20:04

## 2025-06-19 RX ADMIN — Medication 0.5 MILLIGRAM(S): at 19:30

## 2025-06-19 NOTE — ASU DISCHARGE PLAN (ADULT/PEDIATRIC) - CALL YOUR DOCTOR IF YOU HAVE ANY OF THE FOLLOWING:
Swelling that gets worse/Pain not relieved by Medications/Fever greater than (need to indicate Fahrenheit or Celsius) Bleeding that does not stop/Swelling that gets worse/Pain not relieved by Medications/Fever greater than (need to indicate Fahrenheit or Celsius)/Nausea and vomiting that does not stop/Unable to urinate

## 2025-06-19 NOTE — H&P ADULT - ASSESSMENT
50 YEAR OLD FEMALE PRESENTS FOR BILATERAL BREASTS AND ABDOMINAL SCAR REVISIONS   -NPO  -IVF  -CONSENT OBTAINED   -PRE OP PROTOCOL INITIATED  -POST OP CARE TO FOLLOW

## 2025-06-19 NOTE — ASU DISCHARGE PLAN (ADULT/PEDIATRIC) - PROVIDER TOKENS
PROVIDER:[TOKEN:[[id number]],SCHEDULEDAPPT:[06/20/2025],SCHEDULEDAPPTTIME:[10:00 AM],ESTABLISHEDPATIENT:[T]]

## 2025-06-19 NOTE — ASU DISCHARGE PLAN (ADULT/PEDIATRIC) - CARE PROVIDER_API CALL
Robert Duron  Plastic Surgery  [address] [facility]  [place] [address]  Phone: [phone]  Fax: [phone]  Established Patient  Scheduled Appointment: 06/20/2025 10:00 AM

## 2025-06-19 NOTE — ASU PREOP CHECKLIST - ISOLATION PRECAUTIONS
none
Patient remained stable in ED, and improved well. Patient remained awake, alert, ambulatory and comfortable, tolerated PO. Discussed with patient in detail about the need for close outpatient follow up and the need to return to ED for any persistent, or worsening symptoms, for any new symptoms/concerns. patient verbalized understanding and agreed. patient is given detail aftercare instructions and is instructed well to f/u as outpatient for further care.

## 2025-06-19 NOTE — ASU DISCHARGE PLAN (ADULT/PEDIATRIC) - FOLLOW UP APPOINTMENTS
639 may also call Recovery Room (PACU) 24/7 @ [phone]/[facility]

## 2025-06-19 NOTE — BRIEF OPERATIVE NOTE - NSICDXBRIEFPROCEDURE_GEN_ALL_CORE_FT
PROCEDURES:  Creation of recipient site by excision of scar of trunk 19-Jun-2025 16:50:24  Robert Duron

## 2025-06-19 NOTE — H&P ADULT - NSHPPHYSICALEXAM_GEN_ALL_CORE
T(C): 36.4 (06-19-25 @ 13:05), Max: 36.4 (06-19-25 @ 13:05)  HR: 89 (06-19-25 @ 13:05) (89 - 89)  BP: 130/75 (06-19-25 @ 13:05) (130/75 - 130/75)  RR: 14 (06-19-25 @ 13:05) (14 - 14)  SpO2: 99% (06-19-25 @ 13:05) (99% - 99%)    CONSTITUTIONAL: Well groomed, no apparent distress  EYES: PERRLA and symmetric, EOMI, No conjunctival or scleral injection, non-icteric  ENMT: Oral mucosa with moist membranes. Normal dentition; no pharyngeal injection or exudates             NECK: Supple, symmetric and without tracheal deviation   RESP: No respiratory distress, no use of accessory muscles; CTA b/l, no WRR  CV: RRR, +S1S2, no MRG; no JVD; no peripheral edema  GI: Soft, NT, ND, no rebound, no guarding; no palpable masses; no hepatosplenomegaly; no hernia palpated  LYMPH: No cervical LAD or tenderness; no axillary LAD or tenderness; no inguinal LAD or tenderness  MSK: Normal gait; No digital clubbing or cyanosis; examination of the (head/neck/spine/ribs/pelvis, RUE, LUE, RLE, LLE) without misalignment,            Normal ROM without pain, no spinal tenderness, normal muscle strength/tone  SKIN: No rashes or ulcers noted; no subcutaneous nodules or induration palpable  NEURO: CN II-XII intact; normal reflexes in upper and lower extremities, sensation intact in upper and lower extremities b/l to light touch   PSYCH: Appropriate insight/judgment; A+O x 3, mood and affect appropriate, recent/remote memory intact

## 2025-06-19 NOTE — ASU DISCHARGE PLAN (ADULT/PEDIATRIC) - FINANCIAL ASSISTANCE
[facility] provides services at a reduced cost to those who are determined to be eligible through [facility]’s financial assistance program. Information regarding [facility]’s financial assistance program can be found by going to [email or web address] or by calling [phone].

## 2025-07-01 LAB — SURGICAL PATHOLOGY STUDY: SIGNIFICANT CHANGE UP

## (undated) DEVICE — MEDICATION LABELS W MARKER

## (undated) DEVICE — DRSG STERISTRIPS 0.5 X 4"

## (undated) DEVICE — DRAPE MAYO STAND 30"

## (undated) DEVICE — WOUND IRR SURGIPHOR

## (undated) DEVICE — BLADE SCALPEL SAFETYLOCK #15

## (undated) DEVICE — SOL BETADINE POUCH 0.75OZ STERILE

## (undated) DEVICE — MARKING PEN W RULER

## (undated) DEVICE — PACK MINOR WITH LAP

## (undated) DEVICE — SUT POLYSORB 2-0 30" GS-21 UNDYED

## (undated) DEVICE — NDL HYPO SAFE 20G X 1.5" (YELLOW)

## (undated) DEVICE — SUT PLAIN GUT 4-0 30" C-13

## (undated) DEVICE — SUT PDO 2 1/2 CIRCLE 40MM NDL 45CM

## (undated) DEVICE — POSITIONER STRAP ARMBOARD VELCRO TS-30

## (undated) DEVICE — TUBING SUCTION NON CONDUCTIVE 316X18" STERILE

## (undated) DEVICE — LABELS BLANK W PEN

## (undated) DEVICE — WARMING BLANKET FULL ADULT

## (undated) DEVICE — SUT VICRYL 1 27" CPX UNDYED

## (undated) DEVICE — POSITIONER PATIENT SAFETY STRAP 3X60"

## (undated) DEVICE — TUBING HI-VAC PSI-TEC STERILE

## (undated) DEVICE — TUBING ASPIRATION HI VAC LIPOSUCTION 8FT

## (undated) DEVICE — SOL IRR POUR H2O 500ML

## (undated) DEVICE — DRAPE TOWEL BLUE 17" X 24"

## (undated) DEVICE — DRSG DERMABOND PRINEO 60CM

## (undated) DEVICE — DRAPE 3/4 SHEET 52X76"

## (undated) DEVICE — DRAIN RESERVOIR FOR JACKSON PRATT 100CC CARDINAL

## (undated) DEVICE — VISITEC 4X4

## (undated) DEVICE — FOLEY TRAY 16FR LF URINE METER SURESTEP

## (undated) DEVICE — ELCTR GROUNDING PAD ADULT COVIDIEN

## (undated) DEVICE — PREP CHLORAPREP HI-LITE ORANGE 26ML

## (undated) DEVICE — BLADE SURGICAL #15 CARBON

## (undated) DEVICE — SPECIMEN CONTAINER 100ML

## (undated) DEVICE — WARMING BLANKET UPPER ADULT

## (undated) DEVICE — DRAPE MAGNETIC INSTRUMENT MEDIUM

## (undated) DEVICE — SUT POLYSORB 2-0 30" V-20 UNDYED

## (undated) DEVICE — DRSG CURITY GAUZE SPONGE 4 X 4" 12-PLY

## (undated) DEVICE — BASIN SET DOUBLE

## (undated) DEVICE — DRSG XEROFORM 1 X 8"

## (undated) DEVICE — BLADE SURGICAL #10 CARBON

## (undated) DEVICE — SUT QUILL MONODERM 3-0 30CM PS-2

## (undated) DEVICE — ELCTR BOVIE TIP BLADE VALLEYLAB 6.5"

## (undated) DEVICE — GOWN TRIMAX LG

## (undated) DEVICE — SUT SOFSILK 2-0 30" SC-2

## (undated) DEVICE — ELCTR BOVIE TIP BLADE INSULATED 6.5" EDGE

## (undated) DEVICE — DRAPE INSTRUMENT POUCH 6.75" X 11"

## (undated) DEVICE — SUT TICRON 0 30" GS-22

## (undated) DEVICE — DRSG DERMABOND PRINEO 42CM

## (undated) DEVICE — BASIN AMBULATORY

## (undated) DEVICE — TROCAR COVIDIEN STEP 5MM SHORT 70MM

## (undated) DEVICE — ELCTR BOVIE PENCIL SMOKE EVACUATION

## (undated) DEVICE — GLV 8 PROTEXIS (WHITE)

## (undated) DEVICE — SUT HISTOACRYL BLUE

## (undated) DEVICE — GLV 7.5 PROTEXIS (WHITE)

## (undated) DEVICE — VENODYNE/SCD SLEEVE CALF LARGE

## (undated) DEVICE — STAPLER SKIN VISI-STAT 35 WIDE

## (undated) DEVICE — BLADE SCALPEL SAFETYLOCK #10

## (undated) DEVICE — PACK ADULT HERNIA

## (undated) DEVICE — DRSG MASTISOL

## (undated) DEVICE — PACK MAJOR ABDOMINAL WITH LAP

## (undated) DEVICE — DRAPE SPLIT SHEET 77" X 120"

## (undated) DEVICE — SUT POLYSORB 3-0 30" V-20 UNDYED

## (undated) DEVICE — SUT PLAIN GUT FAST ABSORBING 5-0 PC-1

## (undated) DEVICE — DRAIN CHANNEL 19FR ROUND FULL FLUTED

## (undated) DEVICE — SUT SOFSILK 2-0 18" C-23

## (undated) DEVICE — DRSG MAMMARY SUPPORT LG SIZE 4

## (undated) DEVICE — DRSG MAMMARY SUPPORT MED SIZE 3

## (undated) DEVICE — SUT MONOCRYL 3-0 27" PS-2 UNDYED

## (undated) DEVICE — NDL HYPO SAFE 18G X 1.5" (PINK)

## (undated) DEVICE — BAG DECANTER DISP

## (undated) DEVICE — Device

## (undated) DEVICE — LAP PAD W RING 18 X 18"

## (undated) DEVICE — ABDOMINAL BINDER XL 9" X 62"-74"

## (undated) DEVICE — ABDOMINAL BINDER MED/LG 9" X 36"-64"

## (undated) DEVICE — SUT MONOCRYL 4-0 27" PS-2 UNDYED

## (undated) DEVICE — SUT PROLENE 2-0 30" CT-2

## (undated) DEVICE — GLV 6.5 PROTEXIS (WHITE)

## (undated) DEVICE — SOL IRR POUR NS 0.9% 500ML

## (undated) DEVICE — FOLEY TRAY 16FR 5CC LTX UMETER CLOSED

## (undated) DEVICE — SUT VICRYL 2-0 27" SH UNDYED

## (undated) DEVICE — ELCTR BOVIE TIP BLADE INSULATED 2.75" EDGE

## (undated) DEVICE — DRSG COMBINE 5X9"

## (undated) DEVICE — NDL SPINAL 22G X 3.5" (BLACK)

## (undated) DEVICE — LAP PAD 18 X 18"

## (undated) DEVICE — SUT MAXON 0 30" GS-22

## (undated) DEVICE — TUBING INFILTRATION

## (undated) DEVICE — DRAIN BLAKE 15FR BARD CHANNEL

## (undated) DEVICE — SYR LUER LOK 50CC

## (undated) DEVICE — DRAIN JACKSON PRATT SILICON RND HUBLESS 30CM CHNNL W/O TROCA

## (undated) DEVICE — SOL IRR POUR H2O 250ML

## (undated) DEVICE — GLV 7 PROTEXIS (WHITE)

## (undated) DEVICE — CANISTER DISPOSABLE THIN WALL 3000CC

## (undated) DEVICE — WARMING BLANKET LOWER ADULT

## (undated) DEVICE — PREP BETADINE KIT

## (undated) DEVICE — DRSG MAMMARY SUPPORT XL SIZE 5

## (undated) DEVICE — DRAIN PENROSE .25" X 18" LATEX

## (undated) DEVICE — ABDOMINAL BINDER MED/LG 12" X 45"-62"

## (undated) DEVICE — DRSG MAMMARY SUPPORT MED SIZE 2

## (undated) DEVICE — SUT QUILL MONODERM 2-0 30CM 18MM

## (undated) DEVICE — SUT QUILL MONODERM 3-0 30CM 19MM

## (undated) DEVICE — GLV 8.5 PROTEXIS (WHITE)

## (undated) DEVICE — SOL IRR BAG NS 0.9% 1000ML

## (undated) DEVICE — SOL IRR NS 0.9% 250ML

## (undated) DEVICE — VENODYNE/SCD SLEEVE CALF MEDIUM

## (undated) DEVICE — WARMING BLANKET FULL UNDERBODY

## (undated) DEVICE — POSITIONER FOAM EGG CRATE ULNAR 2PCS (PINK)

## (undated) DEVICE — DRSG TEGADERM + PAD 3.5X4"

## (undated) DEVICE — DRSG XEROFORM 5 X 9"

## (undated) DEVICE — DRSG KERLIX ROLL 4.5"

## (undated) DEVICE — SUT POLYSORB 1 27" GS-21 UNDYED

## (undated) DEVICE — SUT SURGIPRO 0 30" GS-22

## (undated) DEVICE — NDL SPINAL 20G X 3.5" (YELLOW)

## (undated) DEVICE — SYR LUER LOK 20CC

## (undated) DEVICE — SUT SURGIPRO 1 30" GS-21